# Patient Record
Sex: MALE
[De-identification: names, ages, dates, MRNs, and addresses within clinical notes are randomized per-mention and may not be internally consistent; named-entity substitution may affect disease eponyms.]

---

## 2018-08-28 ENCOUNTER — HOSPITAL ENCOUNTER (OUTPATIENT)
Dept: HOSPITAL 31 - C.ER | Age: 53
Setting detail: OBSERVATION
LOS: 1 days | Discharge: HOME | End: 2018-08-29
Attending: INTERNAL MEDICINE | Admitting: INTERNAL MEDICINE
Payer: COMMERCIAL

## 2018-08-28 DIAGNOSIS — Z80.3: ICD-10-CM

## 2018-08-28 DIAGNOSIS — F17.200: ICD-10-CM

## 2018-08-28 DIAGNOSIS — R06.02: ICD-10-CM

## 2018-08-28 DIAGNOSIS — Z82.49: ICD-10-CM

## 2018-08-28 DIAGNOSIS — E78.00: ICD-10-CM

## 2018-08-28 DIAGNOSIS — Z80.0: ICD-10-CM

## 2018-08-28 DIAGNOSIS — Z79.82: ICD-10-CM

## 2018-08-28 DIAGNOSIS — E78.5: ICD-10-CM

## 2018-08-28 DIAGNOSIS — R07.9: Primary | ICD-10-CM

## 2018-08-28 DIAGNOSIS — Z83.3: ICD-10-CM

## 2018-08-28 LAB
ALBUMIN SERPL-MCNC: 4.1 G/DL (ref 3.5–5)
ALBUMIN/GLOB SERPL: 1.4 {RATIO} (ref 1–2.1)
ALT SERPL-CCNC: 37 U/L (ref 21–72)
AST SERPL-CCNC: 23 U/L (ref 17–59)
BASOPHILS # BLD AUTO: 0.1 K/UL (ref 0–0.2)
BASOPHILS NFR BLD: 0.9 % (ref 0–2)
BNP SERPL-MCNC: 16.4 PG/ML (ref 0–900)
BUN SERPL-MCNC: 15 MG/DL (ref 9–20)
CALCIUM SERPL-MCNC: 9 MG/DL (ref 8.6–10.4)
CK MB SERPL-MCNC: 0.72 NG/ML (ref 0–3.38)
EOSINOPHIL # BLD AUTO: 0.1 K/UL (ref 0–0.7)
EOSINOPHIL NFR BLD: 1.4 % (ref 0–4)
ERYTHROCYTE [DISTWIDTH] IN BLOOD BY AUTOMATED COUNT: 13.4 % (ref 11.5–14.5)
GFR NON-AFRICAN AMERICAN: > 60
HGB BLD-MCNC: 15.8 G/DL (ref 12–18)
LYMPHOCYTES # BLD AUTO: 2.9 K/UL (ref 1–4.3)
LYMPHOCYTES NFR BLD AUTO: 30 % (ref 20–40)
MCH RBC QN AUTO: 31.3 PG (ref 27–31)
MCHC RBC AUTO-ENTMCNC: 35.2 G/DL (ref 33–37)
MCV RBC AUTO: 88.9 FL (ref 80–94)
MONOCYTES # BLD: 0.8 K/UL (ref 0–0.8)
MONOCYTES NFR BLD: 8.5 % (ref 0–10)
NEUTROPHILS # BLD: 5.7 K/UL (ref 1.8–7)
NEUTROPHILS NFR BLD AUTO: 59.2 % (ref 50–75)
NRBC BLD AUTO-RTO: 0 % (ref 0–2)
PLATELET # BLD: 199 K/UL (ref 130–400)
PMV BLD AUTO: 9.1 FL (ref 7.2–11.7)
RBC # BLD AUTO: 5.06 MIL/UL (ref 4.4–5.9)
WBC # BLD AUTO: 9.6 K/UL (ref 4.8–10.8)

## 2018-08-28 PROCEDURE — 71045 X-RAY EXAM CHEST 1 VIEW: CPT

## 2018-08-28 PROCEDURE — 99285 EMERGENCY DEPT VISIT HI MDM: CPT

## 2018-08-28 PROCEDURE — 80053 COMPREHEN METABOLIC PANEL: CPT

## 2018-08-28 PROCEDURE — 85378 FIBRIN DEGRADE SEMIQUANT: CPT

## 2018-08-28 PROCEDURE — 84484 ASSAY OF TROPONIN QUANT: CPT

## 2018-08-28 PROCEDURE — 82550 ASSAY OF CK (CPK): CPT

## 2018-08-28 PROCEDURE — 80061 LIPID PANEL: CPT

## 2018-08-28 PROCEDURE — 36415 COLL VENOUS BLD VENIPUNCTURE: CPT

## 2018-08-28 PROCEDURE — 85025 COMPLETE CBC W/AUTO DIFF WBC: CPT

## 2018-08-28 PROCEDURE — 93005 ELECTROCARDIOGRAM TRACING: CPT

## 2018-08-28 PROCEDURE — 83880 ASSAY OF NATRIURETIC PEPTIDE: CPT

## 2018-08-28 NOTE — C.PDOC
History Of Present Illness


52 year old male presents to the emergency department with complaints of 

shortness of breath and chest pain radiating to his left arm. He denies taking 

aspirin. He reports a past medical history of high cholesterol, and reports 

that he is a smoker. He denies a history of heart disease, but states that it 

runs in his family. 


Time Seen by Provider: 08/28/18 12:10


Chief Complaint (Nursing): Chest Pain


History Per: Patient


History/Exam Limitations: no limitations


Onset/Duration Of Symptoms: Hrs


Current Symptoms Are (Timing): Still Present


Quality: "Pain"


Associated Symptoms: Other (shortness of breath, chest pain)





Past Medical History


Reviewed: Historical Data, Nursing Documentation, Vital Signs


Vital Signs: 


 Last Vital Signs











Temp  98.3 F   08/29/18 07:00


 


Pulse  64   08/29/18 08:05


 


Resp  20   08/29/18 07:00


 


BP  133/79   08/29/18 07:00


 


Pulse Ox  97   08/29/18 08:05














- Medical History


PMH: Hypercholesterolemia


Surgical History: No Surg Hx





- [facility] Procedures








APPLICATION OF SPLINT (05/03/15)








Family History: States: Other


Other Family History: heart disease





- Social History


Hx Tobacco Use: Yes


Hx Alcohol Use: No


Hx Substance Use: No





- Immunization History


Hx Tetanus Toxoid Vaccination: No


Hx Influenza Vaccination: No


Hx Pneumococcal Vaccination: No





Review Of Systems


Except As Marked, All Systems Reviewed And Found Negative.


Cardiovascular: Positive for: Chest Pain


Respiratory: Positive for: Shortness of Breath





Physical Exam





- Physical Exam


Appears: Non-toxic, No Acute Distress


Skin: Warm, Dry


Head: Atraumatic, Normacephalic


Eye(s): bilateral: Normal Inspection


Neck: Normal, Supple


Chest: Symmetrical, No Tenderness


Cardiovascular: Rhythm Regular, No Murmur


Respiratory: No Rales, No Rhonchi, No Wheezing


Gastrointestinal/Abdominal: Soft, No Tenderness, No Guarding, No Rebound


Extremity: Normal ROM


Pulses: Left Dorsalis Pedis: Normal, Right Dorsalis Pedis: Normal


Neurological/Psych: Oriented x3, Normal Speech, Normal Cognition





ED Course And Treatment





- Laboratory Results


Result Diagrams: 


 08/29/18 08:06





 08/29/18 08:06


ECG Interpretation: Normal


Interpretation Of ECG: Normal sinus rhythm at 70bpm, normal axis, no ST/T wave 

abnormalities.


O2 Sat by Pulse Oximetry: 98 (RA)


Pulse Ox Interpretation: Normal





Medical Decision Making


Medical Decision Making: 


Assessment: Chest pain





Plan:


EKG


BNP


CMP


CPK


Troponin I


CBC


CXR 


Ecotrin 325mg PO





1408 - discussed with DR. Javier and will admit to tele observation 





Disposition


Discussed With DrMoise: [facility]


Doctor Will See Patient In The: Hospital


Counseled Patient/Family Regarding: Studies Performed, Diagnosis





- Disposition


Disposition: HOSPITALIZED


Disposition Time: 14:10


Condition: FAIR





- Clinical Impression


Clinical Impression: 


 Chest pain








- Scribe Statement


The provider has reviewed the documentation as recorded by the Scribe (Turner 

Fredy)


Provider Attestation: 


All medical record entries made by the Scribe were at my direction and 

personally dictated by me. I have reviewed the chart and agree that the record 

accurately reflects my personal performance of the history, physical exam, 

medical decision making, and the department course for this patient. I have 

also personally directed, reviewed, and agree with the discharge instructions 

and disposition.

## 2018-08-28 NOTE — RAD
Date of service: 



08/28/2018



PROCEDURE:  CHEST RADIOGRAPH, 1 VIEW



HISTORY:

SOB



COMPARISON:

None available.



FINDINGS:



LUNGS:

Clear.



PLEURA:

No pneumothorax or pleural fluid seen.



CARDIOVASCULAR:

Normal.



OSSEOUS STRUCTURES:

No significant abnormalities.



VISUALIZED UPPER ABDOMEN:

Normal.



OTHER FINDINGS:

None. 



IMPRESSION:

No active disease.

## 2018-08-28 NOTE — CP.PCM.HP
Addendum entered and electronically signed by Papito Ashraf  18 19:46: 


Please see updated assessment/plan:





Assessment/Plan: 





52 year old male with a PMHx of HLD presented to ED  for chest pain:





1) Chest pain R/O ACS


- Admit to obs/tele


- CXR negative


- Trops/ EKG neg X1, repeat both @1830, 0030 ()


- Cardiology consult, recs appreciated 


- F/u lipid panel in AM


- Aspirin 81mg daily





2) L shoulder pain


- Pt has history of L shoulder injury


- F/u outpatient 





3) Hyperlipidemia 


- F/u lipid panel in AM


- Crestor 5mg PO daily





4) Prophylaxis 


- DVT risk assessment for DVT prophylaxis = 1, SCDs


- OOB w/ assist








Original Note:








<Papito Ashraf - Last Filed: 18 19:45>





History of Present Illness





- History of Present Illness


History of Present Illness: 


PGY1 History and Physical for Hospitalist Dr. Rudolph 





52 year old male with a PMHx of HLD presented to ED  for chest pain. Pain 

started at 11 am, while walking back to bed post voiding, he had sudden onset 

of left sided squeezing chest pain, rated 9/10 in intensity, that radiated into 

his left shoulder and left back associated with dizziness, chills and 

diaphoresis. Upon arrival, in the ED, 25 minutes post onset of pain, he notes 

the dizziness and chills resolved, and his pain was at 6/10 without any 

medication being administered en route. He received  mg in the ED and 

notes post medication the pain in his chest has resolved but his left shoulder 

pain persists, and is still a 4/10. He denies fever, chills, nausea, vomiting, 

congestion, dizziness, headache, recent travel, sick contacts, vision changes, 

sore throat, palpitations, hematuria, diarrhea, constipation, or similar 

symptoms in the past. Of note, patient had a myocardial perfusion scan in  

that showed an EF of 67%. Patient does not remember why this scan was completed

, but wife states he had multiple episodes of dizziness at that time requiring 

this scan. 4 months ago patient had two episodes of syncope. He was seen at a 

hospital in [place] where he was found to have a low K - the K was repleted and 

he was advised to increase his ASA dose from one 81 mg tablet per day to 2. He 

has been taking ASA for 8 years. 





PMHx: Left shoulder fracture 3 years ago, Hyperlipidemia 


Medications: ASA 81 mg x 2, Lipator 10 mg


PSHx: Denies 


Allergies: Denies 


SHx: Tobacco: 10 cigs/day x 30 years, Drugs: Cocaine x 5 years, was daily user (

quit 8 years ago), Alcohol: 6 pack and pint of [place] per day x 5 years (quit 

8 years ago) 


Family History: Mother - breast cancer ( at 57); Father - MI (64 yo); 2 

sister (1 had uterine cancer); 5 brothers (1 - colon cancer) (2 have DM)


Code Status: Full Code 


If patient is unable to make decisions for himself, wife = Anju Trotter ([phone])





Present on Admission





- Present on Admission


Any Indicators Present on Admission: No





Review of Systems





- Constitutional


Constitutional: absent: Headache, Night Sweats





- EENT


Eyes: absent: Blurred Vision, Sees Flashes





- Cardiovascular


Cardiovascular: Chest Pain.  absent: Diaphoresis, Edema





- Respiratory


Respiratory: absent: Cough, Dyspnea, Hemoptysis, Wheezing





- Gastrointestinal


Gastrointestinal: absent: Constipation, Diarrhea, Early Satiety





- Neurological


Neurological: absent: Dizziness, Numbness, Headaches





- Psychiatric


Psychiatric: absent: Behavioral Changes, Confusion, Depression





- Hematologic/Lymphatic


Hematologic: absent: Easy Bleeding, Easy Bruising





Past Patient History





- Past Social History


Smoking Status: Heavy Smoker > 10 Cigarettes Daily





- CARDIAC


Hx Hypercholesterolemia: Yes





- PSYCHIATRIC


Hx Substance Use: No





- SURGICAL HISTORY


Hx Surgeries: No





- ANESTHESIA


Hx Anesthesia: No





Meds


Allergies/Adverse Reactions: 


 Allergies











Allergy/AdvReac Type Severity Reaction Status Date / Time


 


No Known Allergies Allergy   Verified 05/03/15 18:19














Physical Exam





- Constitutional


Appears: Well, Non-toxic, No Acute Distress





- Head Exam


Head Exam: NORMAL INSPECTION, NORMOCEPHALIC





- Eye Exam


Eye Exam: EOMI, Normal appearance, PERRL


Pupil Exam: NORMAL ACCOMODATION





- ENT Exam


ENT Exam: Mucous Membranes Moist





- Neck Exam


Neck exam: Positive for: Normal Inspection





- Respiratory Exam


Respiratory Exam: Clear to Auscultation Bilateral, NORMAL BREATHING PATTERN.  

absent: Rales, Rhonchi, Wheezes





- Cardiovascular Exam


Cardiovascular Exam: absent: Irregular Rhythm, +S1, +S2, Systolic Murmur





- GI/Abdominal Exam


GI & Abdominal Exam: Normal Bowel Sounds, Soft.  absent: Distended, Firm, 

Guarding





- Extremities Exam


Extremities exam: Positive for: normal inspection.  Negative for: calf 

tenderness, pedal edema, tenderness





- Back Exam


Back exam: NORMAL INSPECTION.  absent: CVA tenderness (L), CVA tenderness (R)





- Neurological Exam


Neurological exam: Alert, CN II-XII Intact, Oriented x3





- Psychiatric Exam


Psychiatric exam: Normal Affect, Normal Mood





- Skin


Skin Exam: Dry, Intact, Normal Color, Warm





Results





- Vital Signs


Recent Vital Signs: 





 Last Vital Signs











Temp  98.6 F   18 12:23


 


Pulse  67   18 14:23


 


Resp  18   18 14:23


 


BP  127/82   18 14:23


 


Pulse Ox  96   18 14:23














- Labs


Result Diagrams: 


 18 12:42





 18 13:21


Labs: 





 Laboratory Results - last 24 hr











  18





  12:42 13:21


 


WBC  9.6 


 


RBC  5.06 


 


Hgb  15.8 


 


Hct  45.0 


 


MCV  88.9 


 


MCH  31.3 H 


 


MCHC  35.2 


 


RDW  13.4 


 


Plt Count  199 


 


MPV  9.1 


 


Neut % (Auto)  59.2 


 


Lymph % (Auto)  30.0 


 


Mono % (Auto)  8.5 


 


Eos % (Auto)  1.4 


 


Baso % (Auto)  0.9 


 


Neut # (Auto)  5.7 


 


Lymph # (Auto)  2.9 


 


Mono # (Auto)  0.8 


 


Eos # (Auto)  0.1 


 


Baso # (Auto)  0.1 


 


Sodium   139


 


Potassium   3.8


 


Chloride   106


 


Carbon Dioxide   24


 


Anion Gap   14


 


BUN   15


 


Creatinine   0.9


 


Est GFR ( Amer)   > 60


 


Est GFR (Non-Af Amer)   > 60


 


Random Glucose   95


 


Calcium   9.0


 


Total Bilirubin   0.5


 


AST   23


 


ALT   37


 


Alkaline Phosphatase   50


 


Total Creatine Kinase   81


 


Troponin I   < 0.0120


 


NT-Pro-B Natriuret Pep   16.4


 


Total Protein   7.0


 


Albumin   4.1


 


Globulin   2.9


 


Albumin/Globulin Ratio   1.4














Assessment & Plan





- Assessment and Plan (Free Text)


Assessment: 





52 year old male with a PMHx of HLD presented to ED  for chest pain:





1) Chest pain R/O ACS


- Admit to obs/tele


- CXR negative


- Trops/ EKG neg X1, repeat both @1830, 0030 ()


- Cardiology consult, recs appreciated 


- F/u lipid panel in AM





2) L shoulder pain


- Pt has history of L shoulder injury


- F/u outpatient 





3) Prophylaxis 


- DVT risk assessment for DVT prophylaxis = 1, SCDs


- OOB w/ assist  





<Godo Rothman - Last Filed: 18 14:32>





Results





- Vital Signs


Recent Vital Signs: 





 Last Vital Signs











Temp  98.6 F   18 16:00


 


Pulse  89   18 16:00


 


Resp  20   18 16:00


 


BP  133/84   18 16:00


 


Pulse Ox  96   18 16:00














- Labs


Result Diagrams: 


 18 08:06





 18 08:06





Attending/Attestation





- Attestation


I have personally seen and examined this patient.: Yes


I have fully participated in the care of the patient.: Yes


I have reviewed all pertinent clinical information: Yes


Notes (Text): 


Patient was seen and examined with the resident


He has risk factors. Hyperlipidemia and smoker


observe overnight,do troponin x3, EKG and cardiology consult


d/w assessment and the plan discussed with the  resident.

## 2018-08-29 VITALS
TEMPERATURE: 98.6 F | SYSTOLIC BLOOD PRESSURE: 133 MMHG | DIASTOLIC BLOOD PRESSURE: 84 MMHG | OXYGEN SATURATION: 96 % | HEART RATE: 89 BPM

## 2018-08-29 VITALS — RESPIRATION RATE: 20 BRPM

## 2018-08-29 LAB
ALBUMIN SERPL-MCNC: 3.8 G/DL (ref 3.5–5)
ALBUMIN/GLOB SERPL: 1.3 {RATIO} (ref 1–2.1)
ALT SERPL-CCNC: 40 U/L (ref 21–72)
AST SERPL-CCNC: 22 U/L (ref 17–59)
BASOPHILS # BLD AUTO: 0.1 K/UL (ref 0–0.2)
BASOPHILS NFR BLD: 1.3 % (ref 0–2)
BUN SERPL-MCNC: 17 MG/DL (ref 9–20)
CALCIUM SERPL-MCNC: 9.1 MG/DL (ref 8.6–10.4)
CK MB SERPL-MCNC: 0.66 NG/ML (ref 0–3.38)
EOSINOPHIL # BLD AUTO: 0.2 K/UL (ref 0–0.7)
EOSINOPHIL NFR BLD: 1.5 % (ref 0–4)
ERYTHROCYTE [DISTWIDTH] IN BLOOD BY AUTOMATED COUNT: 13.6 % (ref 11.5–14.5)
GFR NON-AFRICAN AMERICAN: > 60
HDLC SERPL-MCNC: 27 MG/DL (ref 30–70)
HGB BLD-MCNC: 15.8 G/DL (ref 12–18)
LDLC SERPL-MCNC: 120 MG/DL (ref 0–129)
LYMPHOCYTES # BLD AUTO: 2.6 K/UL (ref 1–4.3)
LYMPHOCYTES NFR BLD AUTO: 25.9 % (ref 20–40)
MCH RBC QN AUTO: 31.2 PG (ref 27–31)
MCHC RBC AUTO-ENTMCNC: 35.1 G/DL (ref 33–37)
MCV RBC AUTO: 88.8 FL (ref 80–94)
MONOCYTES # BLD: 0.9 K/UL (ref 0–0.8)
MONOCYTES NFR BLD: 8.6 % (ref 0–10)
NEUTROPHILS # BLD: 6.2 K/UL (ref 1.8–7)
NEUTROPHILS NFR BLD AUTO: 62.7 % (ref 50–75)
NRBC BLD AUTO-RTO: 0.1 % (ref 0–2)
PLATELET # BLD: 187 K/UL (ref 130–400)
PMV BLD AUTO: 9.2 FL (ref 7.2–11.7)
RBC # BLD AUTO: 5.08 MIL/UL (ref 4.4–5.9)
WBC # BLD AUTO: 9.9 K/UL (ref 4.8–10.8)

## 2018-08-29 NOTE — CP.PCM.CON
History of Present Illness





- History of Present Illness


History of Present Illness: 





52 year old man , a smoker, with treated lipids. Works the night shift as a 

supervisor.





Pt got up from sleep to go to the bathroom, noted left sided chest pain 

adjacent to the shoulder. It lasted for a while worse if he took a deep breath 

and if he lay on that side. Arrived at the hospital and pain was not relived 

for several hours after that.


ECG normal, and negative TNI. Pt is walking the halls without chest pain.





Review of Systems





- Review of Systems


All systems: reviewed and no additional remarkable complaints except (as above.)





Past Patient History





- Past Social History


Smoking Status: Heavy Smoker > 10 Cigarettes Daily





- CARDIAC


Hx Hypercholesterolemia: Yes





- PSYCHIATRIC


Hx Substance Use: No





- SURGICAL HISTORY


Hx Surgeries: No





- ANESTHESIA


Hx Anesthesia: No





Meds


Allergies/Adverse Reactions: 


 Allergies











Allergy/AdvReac Type Severity Reaction Status Date / Time


 


No Known Allergies Allergy   Verified 05/03/15 18:19














- Medications


Medications: 


 Current Medications





Aspirin (Ecotrin)  81 mg PO DAILY [facility]


   Last Admin: 08/29/18 10:10 Dose:  81 mg


Enoxaparin Sodium (Lovenox)  30 mg SC DAILY [facility]


   Last Admin: 08/29/18 10:11 Dose:  30 mg


Rosuvastatin Calcium (Crestor)  5 mg PO HS [facility]


   Last Admin: 08/28/18 21:41 Dose:  5 mg











Physical Exam





- Constitutional


Appears: Well





- Head Exam


Head Exam: ATRAUMATIC





- Eye Exam


Eye Exam: EOMI





- ENT Exam


ENT Exam: Mucous Membranes Moist





- Neck Exam


Neck exam: Positive for: Full Rom





- Respiratory Exam


Respiratory Exam: Clear to Auscultation Bilateral





- Cardiovascular Exam


Cardiovascular Exam: REGULAR RHYTHM


Additional comments: 


PRESSING ON IS CHEST ADJACENT TO THE SHOULDER EASILY ELICITS HIS DESCRIBED 

CHARACTERISTIC CHEST PAIN





- GI/Abdominal Exam


GI & Abdominal Exam: Normal Bowel Sounds





- Rectal Exam


Rectal Exam: Deferred





- Extremities Exam


Extremities exam: Positive for: normal inspection





- Back Exam


Back exam: NORMAL INSPECTION





- Neurological Exam


Neurological exam: Alert, CN II-XII Intact, Normal Gait, Oriented x3





- Psychiatric Exam


Psychiatric exam: Normal Affect, Normal Mood





- Skin


Skin Exam: Normal Color





Results





- Vital Signs


Recent Vital Signs: 


 Last Vital Signs











Temp  98.3 F   08/29/18 07:00


 


Pulse  64   08/29/18 12:00


 


Resp  20   08/29/18 07:00


 


BP  133/79   08/29/18 07:00


 


Pulse Ox  98   08/29/18 10:54














- Labs


Result Diagrams: 


 08/29/18 08:06





 08/29/18 08:06


Labs: 


 Laboratory Results - last 24 hr











  08/28/18 08/28/18 08/29/18





  19:31 19:31 00:58


 


WBC   


 


RBC   


 


Hgb   


 


Hct   


 


MCV   


 


MCH   


 


MCHC   


 


RDW   


 


Plt Count   


 


MPV   


 


Neut % (Auto)   


 


Lymph % (Auto)   


 


Mono % (Auto)   


 


Eos % (Auto)   


 


Baso % (Auto)   


 


Neut # (Auto)   


 


Lymph # (Auto)   


 


Mono # (Auto)   


 


Eos # (Auto)   


 


Baso # (Auto)   


 


D-Dimer, Quantitative   < 200 


 


Sodium   


 


Potassium   


 


Chloride   


 


Carbon Dioxide   


 


Anion Gap   


 


BUN   


 


Creatinine   


 


Est GFR ( Amer)   


 


Est GFR (Non-Af Amer)   


 


Random Glucose   


 


Calcium   


 


Total Bilirubin   


 


AST   


 


ALT   


 


Alkaline Phosphatase   


 


Total Creatine Kinase  72   66


 


CK-MB (Mass)  0.72   0.66


 


Troponin I  < 0.0120   < 0.0120


 


Total Protein   


 


Albumin   


 


Globulin   


 


Albumin/Globulin Ratio   


 


Triglycerides   


 


Cholesterol   


 


LDL Cholesterol Direct   


 


HDL Cholesterol   














  08/29/18 08/29/18





  08:06 08:06


 


WBC  9.9 


 


RBC  5.08 


 


Hgb  15.8 


 


Hct  45.1 


 


MCV  88.8 


 


MCH  31.2 H 


 


MCHC  35.1 


 


RDW  13.6 


 


Plt Count  187 


 


MPV  9.2 


 


Neut % (Auto)  62.7 


 


Lymph % (Auto)  25.9 


 


Mono % (Auto)  8.6 


 


Eos % (Auto)  1.5 


 


Baso % (Auto)  1.3 


 


Neut # (Auto)  6.2 


 


Lymph # (Auto)  2.6 


 


Mono # (Auto)  0.9 H 


 


Eos # (Auto)  0.2 


 


Baso # (Auto)  0.1 


 


D-Dimer, Quantitative  


 


Sodium   140


 


Potassium   4.3


 


Chloride   106


 


Carbon Dioxide   26


 


Anion Gap   13


 


BUN   17


 


Creatinine   0.9


 


Est GFR ( Amer)   > 60


 


Est GFR (Non-Af Amer)   > 60


 


Random Glucose   106


 


Calcium   9.1


 


Total Bilirubin   0.6


 


AST   22


 


ALT   40


 


Alkaline Phosphatase   44


 


Total Creatine Kinase  


 


CK-MB (Mass)  


 


Troponin I  


 


Total Protein   6.6


 


Albumin   3.8


 


Globulin   2.8


 


Albumin/Globulin Ratio   1.3


 


Triglycerides   96


 


Cholesterol   166


 


LDL Cholesterol Direct   120


 


HDL Cholesterol   27 L














- EKG Data


EKG Interpreted by: Myself


EKG shows normal: Sinus rhythm


Rate: Normal (normal)





Assessment & Plan





- Assessment and Plan (Free Text)


Assessment: 





1, Chest pain is reproducible, positional and pleuritic, non anginal. ECG and 

TNI normal. Pt will be discharged and come for an outpatient stress test, as he 

is a smoker, and f/u with me after stress test


2. Pt will continue asa and statin


3. Pt advised to stop smoking and advised on how to do so.

## 2018-08-29 NOTE — CP.PCM.PN
Subjective





- Date & Time of Evaluation


Date of Evaluation: 08/29/18


Time of Evaluation: 07:25





Objective





- Vital Signs/Intake and Output


Vital Signs (last 24 hours): 


 











Temp Pulse Resp BP Pulse Ox


 


 98 F   68   18   116/65   95 


 


 08/28/18 23:17  08/28/18 23:35  08/28/18 23:17  08/28/18 23:17  08/28/18 23:17











- Medications


Medications: 


 Current Medications





Aspirin (Ecotrin)  81 mg PO DAILY [facility]


Rosuvastatin Calcium (Crestor)  5 mg PO [facility]


   Last Admin: 08/28/18 21:41 Dose:  5 mg











- Labs


Labs: 


 





 08/28/18 12:42 





 08/28/18 13:21

## 2018-08-29 NOTE — CP.PCM.DIS
<Doni Pool - Last Filed: 08/29/18 19:00>





Provider





- Provider


Date of Admission: 


08/28/18 14:09





Attending physician: 


Good Rothman MD





Time Spent in preparation of Discharge (in minutes): 45





Diagnosis





- Discharge Diagnosis


(1) Chest pain


Status: Acute   





Hospital Course





- Lab Results


Lab Results: 


 Most Recent Lab Values











WBC  9.9 K/uL (4.8-10.8)   08/29/18  08:06    


 


RBC  5.08 Mil/uL (4.40-5.90)   08/29/18  08:06    


 


Hgb  15.8 g/dL (12.0-18.0)   08/29/18  08:06    


 


Hct  45.1 % (35.0-51.0)   08/29/18  08:06    


 


MCV  88.8 fL (80.0-94.0)   08/29/18  08:06    


 


MCH  31.2 pg (27.0-31.0)  H  08/29/18  08:06    


 


MCHC  35.1 g/dL (33.0-37.0)   08/29/18  08:06    


 


RDW  13.6 % (11.5-14.5)   08/29/18  08:06    


 


Plt Count  187 K/uL (130-400)   08/29/18  08:06    


 


MPV  9.2 fL (7.2-11.7)   08/29/18  08:06    


 


Neut % (Auto)  62.7 % (50.0-75.0)   08/29/18  08:06    


 


Lymph % (Auto)  25.9 % (20.0-40.0)   08/29/18  08:06    


 


Mono % (Auto)  8.6 % (0.0-10.0)   08/29/18  08:06    


 


Eos % (Auto)  1.5 % (0.0-4.0)   08/29/18  08:06    


 


Baso % (Auto)  1.3 % (0.0-2.0)   08/29/18  08:06    


 


Neut # (Auto)  6.2 K/uL (1.8-7.0)   08/29/18  08:06    


 


Lymph # (Auto)  2.6 K/uL (1.0-4.3)   08/29/18  08:06    


 


Mono # (Auto)  0.9 K/uL (0.0-0.8)  H  08/29/18  08:06    


 


Eos # (Auto)  0.2 K/uL (0.0-0.7)   08/29/18  08:06    


 


Baso # (Auto)  0.1 K/uL (0.0-0.2)   08/29/18  08:06    


 


D-Dimer, Quantitative  < 200 ng/mlDDU (0-243)   08/28/18  19:31    


 


Sodium  140 mmol/L (132-148)   08/29/18  08:06    


 


Potassium  4.3 mmol/L (3.6-5.2)   08/29/18  08:06    


 


Chloride  106 mmol/L ()   08/29/18  08:06    


 


Carbon Dioxide  26 mmol/L (22-30)   08/29/18  08:06    


 


Anion Gap  13  (10-20)   08/29/18  08:06    


 


BUN  17 mg/dL (9-20)   08/29/18  08:06    


 


Creatinine  0.9 mg/dL (0.8-1.5)   08/29/18  08:06    


 


Est GFR ( Amer)  > 60   08/29/18  08:06    


 


Est GFR (Non-Af Amer)  > 60   08/29/18  08:06    


 


Random Glucose  106 mg/dL ()   08/29/18  08:06    


 


Calcium  9.1 mg/dl (8.6-10.4)   08/29/18  08:06    


 


Total Bilirubin  0.6 mg/dL (0.2-1.3)   08/29/18  08:06    


 


AST  22 U/L (17-59)   08/29/18  08:06    


 


ALT  40 U/L (21-72)   08/29/18  08:06    


 


Alkaline Phosphatase  44 U/L ()   08/29/18  08:06    


 


Total Creatine Kinase  66 U/L ()   08/29/18  00:58    


 


CK-MB (Mass)  0.66 ng/mL (0.0-3.38)   08/29/18  00:58    


 


Troponin I  < 0.0120 ng/mL (0.00-0.120)   08/29/18  00:58    


 


NT-Pro-B Natriuret Pep  16.4 pg/mL (0-900)   08/28/18  13:21    


 


Total Protein  6.6 g/dL (6.3-8.3)   08/29/18  08:06    


 


Albumin  3.8 g/dL (3.5-5.0)   08/29/18  08:06    


 


Globulin  2.8 gm/dL (2.2-3.9)   08/29/18  08:06    


 


Albumin/Globulin Ratio  1.3  (1.0-2.1)   08/29/18  08:06    


 


Triglycerides  96 mg/dL (0-149)   08/29/18  08:06    


 


Cholesterol  166 mg/dL (0-199)   08/29/18  08:06    


 


LDL Cholesterol Direct  120 mg/dL (0-129)   08/29/18  08:06    


 


HDL Cholesterol  27 mg/dL (30-70)  L  08/29/18  08:06    














- Hospital Course


Hospital Course: 


HPI:


Patient is a 52 year old male with a PMH of HLD who presented to the ED 

reporting that at 11 am, while walking back to bed post voiding, he had sudden 

onset of left sided squeezing chest pain, rated 9/10 in intensity, that 

radiated into his left shoulder and left back associated with dizziness, chills 

and diaphoresis. After pain persisted for 10 minutes, wife became concerned and 

called 911. When patient arrived in the ED, about 25 minutes post onset of pain

, he notes the dizziness and chills resolved, and his pain was at 6/10 without 

any medication being administered en route. He received  mg in the ED 

and notes post medication the pain in his chest has resolved but his left 

shoulder pain persists, and is still a 4/10. He denies fever, chills, nausea, 

vomiting, congestion, dizziness, headache, recent travel, sick contacts, vision 

changes, sore throat, palpitations, hematuria, diarrhea, constipation, or 

similar symptoms in the past. Of note, patient had a myocardial perfusion scan 

in 2013 that showed an EF of 67%. Patient does not remember why this scan was 

completed, but wife states he had multiple episodes of dizziness at that time 

requiring this scan. 4 months ago patient had two episodes of syncope. He was 

seen at a hospital in [place] where he was found to have a low K - the K was 

repleted and he was advised to increase his ASA dose from one 81 mg tablet per 

day to 2. He has been taking ASA for 8 years.





Hospital Course:


Pt was admitted to floors for observation for acute chest pain radiating to L 

shoulder to rule out acute coronary event.  Patient's EKG was normal and 

cardiac enzymes were negative x3.  Patient does have history of L shoulder 

fracture and has chronic shoulder pain.  Patient was seen by Cardio Dr. Bush cleared patient to be discharged with follow up and outpatient stress 

test.





CXR 8/28 - No active disease


EKG 8/28 - Normal sinus rhythm, no ST or T changes, normal EKG





Plan:


Patient discharged for follow up with cardio and outpatient stress test.  Per 

cardio patient to resume Aspirin and Lipitor.





This is a brief summary, please refer to EMR for more information.











Discharge Exam





- Head Exam


Head Exam: ATRAUMATIC





- Eye Exam


Eye Exam: EOMI, Normal appearance


Pupil Exam: PERRL





- ENT Exam


ENT Exam: Mucous Membranes Moist





- Respiratory Exam


Respiratory Exam: UNREMARKABLE.  absent: Accessory Muscle Use





- Cardiovascular Exam


Cardiovascular Exam: REGULAR RHYTHM, +S1, +S2





- GI/Abdominal Exam


GI & Abdominal Exam: Normal Bowel Sounds, Unremarkable





- Rectal Exam


Rectal Exam: NORMAL INSPECTION





- Extremities Exam


Extremities exam: normal inspection, pedal pulses present





- Neurological Exam


Neurological exam: Alert, CN II-XII Intact, Oriented x3





- Skin


Skin Exam: Dry, Normal Color





Discharge Plan





- Discharge Medications


Prescriptions: 


Aspirin [Ecotrin] 81 mg PO DAILY #14 tabec





- Follow Up Plan


Condition: FAIR


Disposition: HOME/ ROUTINE


Instructions:  Smoking: Not Just Harmful to Your Lungs and Heart, Chest Pain (DC

), Quitting Smoking


Additional Instructions: 


Please return to ED if symptoms resolve or worsen.  Pt to follow up with Dr. Bush for outpatient stress test.  Please take your medications as follows:


Aspirin 81 mg PO once a day by mouth


Lipitor 10 mg once daily by mouth


Referrals: 


Austin Bush MD [Staff Provider] - 


Miguel Weldon MD [Staff Provider] - 





<Good Rothman - Last Filed: 08/30/18 14:33>





Provider





- Provider


Date of Admission: 


08/28/18 14:09





Attending physician: 


Good Rothman MD








Hospital Course





- Lab Results


Lab Results: 


 Most Recent Lab Values











WBC  9.9 K/uL (4.8-10.8)   08/29/18  08:06    


 


RBC  5.08 Mil/uL (4.40-5.90)   08/29/18  08:06    


 


Hgb  15.8 g/dL (12.0-18.0)   08/29/18  08:06    


 


Hct  45.1 % (35.0-51.0)   08/29/18  08:06    


 


MCV  88.8 fL (80.0-94.0)   08/29/18  08:06    


 


MCH  31.2 pg (27.0-31.0)  H  08/29/18  08:06    


 


MCHC  35.1 g/dL (33.0-37.0)   08/29/18  08:06    


 


RDW  13.6 % (11.5-14.5)   08/29/18  08:06    


 


Plt Count  187 K/uL (130-400)   08/29/18  08:06    


 


MPV  9.2 fL (7.2-11.7)   08/29/18  08:06    


 


Neut % (Auto)  62.7 % (50.0-75.0)   08/29/18  08:06    


 


Lymph % (Auto)  25.9 % (20.0-40.0)   08/29/18  08:06    


 


Mono % (Auto)  8.6 % (0.0-10.0)   08/29/18  08:06    


 


Eos % (Auto)  1.5 % (0.0-4.0)   08/29/18  08:06    


 


Baso % (Auto)  1.3 % (0.0-2.0)   08/29/18  08:06    


 


Neut # (Auto)  6.2 K/uL (1.8-7.0)   08/29/18  08:06    


 


Lymph # (Auto)  2.6 K/uL (1.0-4.3)   08/29/18  08:06    


 


Mono # (Auto)  0.9 K/uL (0.0-0.8)  H  08/29/18  08:06    


 


Eos # (Auto)  0.2 K/uL (0.0-0.7)   08/29/18  08:06    


 


Baso # (Auto)  0.1 K/uL (0.0-0.2)   08/29/18  08:06    


 


D-Dimer, Quantitative  < 200 ng/mlDDU (0-243)   08/28/18  19:31    


 


Sodium  140 mmol/L (132-148)   08/29/18  08:06    


 


Potassium  4.3 mmol/L (3.6-5.2)   08/29/18  08:06    


 


Chloride  106 mmol/L ()   08/29/18  08:06    


 


Carbon Dioxide  26 mmol/L (22-30)   08/29/18  08:06    


 


Anion Gap  13  (10-20)   08/29/18  08:06    


 


BUN  17 mg/dL (9-20)   08/29/18  08:06    


 


Creatinine  0.9 mg/dL (0.8-1.5)   08/29/18  08:06    


 


Est GFR ( Amer)  > 60   08/29/18  08:06    


 


Est GFR (Non-Af Amer)  > 60   08/29/18  08:06    


 


Random Glucose  106 mg/dL ()   08/29/18  08:06    


 


Calcium  9.1 mg/dl (8.6-10.4)   08/29/18  08:06    


 


Total Bilirubin  0.6 mg/dL (0.2-1.3)   08/29/18  08:06    


 


AST  22 U/L (17-59)   08/29/18  08:06    


 


ALT  40 U/L (21-72)   08/29/18  08:06    


 


Alkaline Phosphatase  44 U/L ()   08/29/18  08:06    


 


Total Creatine Kinase  66 U/L ()   08/29/18  00:58    


 


CK-MB (Mass)  0.66 ng/mL (0.0-3.38)   08/29/18  00:58    


 


Troponin I  < 0.0120 ng/mL (0.00-0.120)   08/29/18  00:58    


 


NT-Pro-B Natriuret Pep  16.4 pg/mL (0-900)   08/28/18  13:21    


 


Total Protein  6.6 g/dL (6.3-8.3)   08/29/18  08:06    


 


Albumin  3.8 g/dL (3.5-5.0)   08/29/18  08:06    


 


Globulin  2.8 gm/dL (2.2-3.9)   08/29/18  08:06    


 


Albumin/Globulin Ratio  1.3  (1.0-2.1)   08/29/18  08:06    


 


Triglycerides  96 mg/dL (0-149)   08/29/18  08:06    


 


Cholesterol  166 mg/dL (0-199)   08/29/18  08:06    


 


LDL Cholesterol Direct  120 mg/dL (0-129)   08/29/18  08:06    


 


HDL Cholesterol  27 mg/dL (30-70)  L  08/29/18  08:06    














Attending/Attestation





- Attestation


I have personally seen and examined this patient.: Yes


I have fully participated in the care of the patient.: Yes


I have reviewed all pertinent clinical information, including history, physical 

exam and plan: Yes

## 2018-08-30 NOTE — CARD
--------------- APPROVED REPORT --------------





Date of service: 08/28/2018



EKG Measurement

Heart [id number]

[id number]

[id number]

[id number]

[id number]



<Conclusion>

Normal sinus rhythm

Normal ECG

## 2018-08-30 NOTE — CARD
--------------- APPROVED REPORT --------------





Date of service: 08/28/2018



EKG Measurement

Heart [id number]

[id number]

[id number]

[id number]

[id number]



<Conclusion>

Normal sinus rhythm

Normal ECG

## 2018-08-30 NOTE — CARD
--------------- APPROVED REPORT --------------





Date of service: 08/29/2018



EKG Measurement

Heart [id number]

[id number]

[id number]

[id number]

[id number]



<Conclusion>

Normal sinus rhythm

Normal ECG